# Patient Record
Sex: MALE | Race: WHITE
[De-identification: names, ages, dates, MRNs, and addresses within clinical notes are randomized per-mention and may not be internally consistent; named-entity substitution may affect disease eponyms.]

---

## 2021-11-15 PROBLEM — Z00.00 ENCOUNTER FOR PREVENTIVE HEALTH EXAMINATION: Status: ACTIVE | Noted: 2021-11-15

## 2021-11-17 ENCOUNTER — APPOINTMENT (OUTPATIENT)
Dept: PULMONOLOGY | Facility: CLINIC | Age: 71
End: 2021-11-17
Payer: COMMERCIAL

## 2021-11-17 VITALS
HEART RATE: 89 BPM | DIASTOLIC BLOOD PRESSURE: 90 MMHG | SYSTOLIC BLOOD PRESSURE: 144 MMHG | HEIGHT: 72 IN | OXYGEN SATURATION: 97 % | BODY MASS INDEX: 24.79 KG/M2 | WEIGHT: 183 LBS | RESPIRATION RATE: 16 BRPM

## 2021-11-17 DIAGNOSIS — Z86.16 PERSONAL HISTORY OF COVID-19: ICD-10-CM

## 2021-11-17 DIAGNOSIS — R93.89 ABNORMAL FINDINGS ON DIAGNOSTIC IMAGING OF OTHER SPECIFIED BODY STRUCTURES: ICD-10-CM

## 2021-11-17 DIAGNOSIS — Z86.79 PERSONAL HISTORY OF OTHER DISEASES OF THE CIRCULATORY SYSTEM: ICD-10-CM

## 2021-11-17 DIAGNOSIS — Z86.39 PERSONAL HISTORY OF OTHER ENDOCRINE, NUTRITIONAL AND METABOLIC DISEASE: ICD-10-CM

## 2021-11-17 DIAGNOSIS — Z87.898 PERSONAL HISTORY OF OTHER SPECIFIED CONDITIONS: ICD-10-CM

## 2021-11-17 PROCEDURE — G0296 VISIT TO DETERM LDCT ELIG: CPT

## 2021-11-17 PROCEDURE — 99204 OFFICE O/P NEW MOD 45 MIN: CPT | Mod: 25

## 2021-11-17 RX ORDER — GLIPIZIDE 10 MG/1
10 TABLET ORAL
Refills: 0 | Status: ACTIVE | COMMUNITY

## 2021-11-17 RX ORDER — ATORVASTATIN CALCIUM 20 MG/1
20 TABLET, FILM COATED ORAL
Refills: 0 | Status: ACTIVE | COMMUNITY

## 2021-11-17 RX ORDER — LEVOTHYROXINE SODIUM 0.05 MG/1
50 TABLET ORAL
Refills: 0 | Status: ACTIVE | COMMUNITY

## 2021-11-17 RX ORDER — CLOPIDOGREL BISULFATE 75 MG/1
75 TABLET, FILM COATED ORAL
Refills: 0 | Status: ACTIVE | COMMUNITY

## 2021-11-17 RX ORDER — INSULIN GLARGINE 100 [IU]/ML
INJECTION, SOLUTION SUBCUTANEOUS
Refills: 0 | Status: ACTIVE | COMMUNITY

## 2021-11-17 RX ORDER — METFORMIN HYDROCHLORIDE 1000 MG/1
1000 TABLET, COATED ORAL
Refills: 0 | Status: ACTIVE | COMMUNITY

## 2021-11-29 ENCOUNTER — NON-APPOINTMENT (OUTPATIENT)
Age: 71
End: 2021-11-29

## 2021-11-29 ENCOUNTER — APPOINTMENT (OUTPATIENT)
Dept: CT IMAGING | Facility: CLINIC | Age: 71
End: 2021-11-29
Payer: COMMERCIAL

## 2021-11-29 ENCOUNTER — OUTPATIENT (OUTPATIENT)
Dept: OUTPATIENT SERVICES | Facility: HOSPITAL | Age: 71
LOS: 1 days | End: 2021-11-29
Payer: COMMERCIAL

## 2021-11-29 VITALS — WEIGHT: 183 LBS | BODY MASS INDEX: 24.79 KG/M2 | HEIGHT: 72 IN

## 2021-11-29 DIAGNOSIS — Z87.891 PERSONAL HISTORY OF NICOTINE DEPENDENCE: ICD-10-CM

## 2021-11-29 PROCEDURE — 71271 CT THORAX LUNG CANCER SCR C-: CPT

## 2021-11-29 PROCEDURE — 71271 CT THORAX LUNG CANCER SCR C-: CPT | Mod: 26

## 2021-11-29 NOTE — HISTORY OF PRESENT ILLNESS
[TextBox_13] : Referred by Dr. Juan Clinton.\par \par Mr. IBARRA is a 71 year old male with a history of high cholesterol, HTN and CAD.\par \par He was called to review eligibility for Low-Dose CT lung cancer screening.  Reviewed and confirmed that the patient meets screening eligibility criteria:\par \par 71 years old \par \par Smoking Status: Former smoker\par \par Number of pack(s) per day: 1\par Number of years smoked: 45\par Number of pack years smokin\par \par Number of years since quitting smokin\par Quit year: 2017\par \par Mr. IBARRA denies any symptoms of lung cancer, including new cough, change in cough, hemoptysis, and unintentional weight loss.\par \par Mr. IBARRA denies any personal history of lung cancer.  No lung cancer in a first degree relative.  Denies any history of lung disease or any history of occupational exposures.

## 2021-12-07 ENCOUNTER — APPOINTMENT (OUTPATIENT)
Dept: PULMONOLOGY | Facility: CLINIC | Age: 71
End: 2021-12-07
Payer: COMMERCIAL

## 2021-12-07 VITALS — WEIGHT: 188 LBS | BODY MASS INDEX: 25.75 KG/M2 | HEIGHT: 71.5 IN

## 2021-12-07 PROCEDURE — 94727 GAS DIL/WSHOT DETER LNG VOL: CPT

## 2021-12-07 PROCEDURE — 94010 BREATHING CAPACITY TEST: CPT

## 2021-12-07 PROCEDURE — 85018 HEMOGLOBIN: CPT | Mod: QW

## 2021-12-07 PROCEDURE — 94729 DIFFUSING CAPACITY: CPT

## 2021-12-08 ENCOUNTER — APPOINTMENT (OUTPATIENT)
Dept: PULMONOLOGY | Facility: CLINIC | Age: 71
End: 2021-12-08
Payer: COMMERCIAL

## 2021-12-08 VITALS
DIASTOLIC BLOOD PRESSURE: 100 MMHG | WEIGHT: 185 LBS | BODY MASS INDEX: 25.44 KG/M2 | SYSTOLIC BLOOD PRESSURE: 180 MMHG | HEART RATE: 86 BPM | OXYGEN SATURATION: 97 % | RESPIRATION RATE: 14 BRPM

## 2021-12-08 PROCEDURE — 99214 OFFICE O/P EST MOD 30 MIN: CPT

## 2021-12-08 RX ORDER — TIOTROPIUM BROMIDE INHALATION SPRAY 3.12 UG/1
2.5 SPRAY, METERED RESPIRATORY (INHALATION) DAILY
Qty: 3 | Refills: 3 | Status: ACTIVE | COMMUNITY
Start: 2021-12-08 | End: 1900-01-01

## 2022-06-06 ENCOUNTER — APPOINTMENT (OUTPATIENT)
Dept: PULMONOLOGY | Facility: CLINIC | Age: 72
End: 2022-06-06

## 2022-08-01 ENCOUNTER — APPOINTMENT (OUTPATIENT)
Dept: PULMONOLOGY | Facility: CLINIC | Age: 72
End: 2022-08-01

## 2022-08-01 VITALS
RESPIRATION RATE: 14 BRPM | BODY MASS INDEX: 25.33 KG/M2 | DIASTOLIC BLOOD PRESSURE: 70 MMHG | HEART RATE: 78 BPM | WEIGHT: 185 LBS | OXYGEN SATURATION: 97 % | HEIGHT: 71.5 IN | SYSTOLIC BLOOD PRESSURE: 142 MMHG

## 2022-08-01 DIAGNOSIS — R01.1 CARDIAC MURMUR, UNSPECIFIED: ICD-10-CM

## 2022-08-01 PROCEDURE — G0296 VISIT TO DETERM LDCT ELIG: CPT

## 2022-08-01 PROCEDURE — 99214 OFFICE O/P EST MOD 30 MIN: CPT | Mod: 25

## 2022-08-01 RX ORDER — LISINOPRIL 20 MG/1
20 TABLET ORAL
Refills: 0 | Status: DISCONTINUED | COMMUNITY
End: 2022-08-01

## 2022-11-28 ENCOUNTER — NON-APPOINTMENT (OUTPATIENT)
Age: 72
End: 2022-11-28

## 2022-11-28 VITALS — HEIGHT: 71.5 IN | BODY MASS INDEX: 25.33 KG/M2 | WEIGHT: 185 LBS

## 2022-11-28 NOTE — HISTORY OF PRESENT ILLNESS
[Former] : Former [TextBox_13] : Referred by Dr. Juan Clinton.\par \par Mr. IBARRA is a 72 year old male with a history of high cholesterol, HTN and CAD.  Reviewed and confirmed that the patient meets screening eligibility criteria:\par \par 72 years old \par \par Smoking Status: Former smoker\par \par Number of pack(s) per day: 1\par Number of years smoked: 45\par Number of pack years smokin\par \par Number of years since quitting smokin\par Quit year: 2017\par \par No symptoms of lung cancer, including new cough, change in cough, hemoptysis, and unintentional weight loss.\par \par No personal history of lung cancer. No lung cancer in a first degree relative. No history of lung disease or occupational exposures.  [YearQuit] : 2017 [PacksperDay] : 1 [N_Years] : 45 [PacksperYear] : 45

## 2022-11-30 ENCOUNTER — OUTPATIENT (OUTPATIENT)
Dept: OUTPATIENT SERVICES | Facility: HOSPITAL | Age: 72
LOS: 1 days | End: 2022-11-30
Payer: COMMERCIAL

## 2022-11-30 ENCOUNTER — APPOINTMENT (OUTPATIENT)
Dept: CT IMAGING | Facility: CLINIC | Age: 72
End: 2022-11-30

## 2022-11-30 DIAGNOSIS — R93.89 ABNORMAL FINDINGS ON DIAGNOSTIC IMAGING OF OTHER SPECIFIED BODY STRUCTURES: ICD-10-CM

## 2022-11-30 DIAGNOSIS — Z87.891 PERSONAL HISTORY OF NICOTINE DEPENDENCE: ICD-10-CM

## 2022-11-30 DIAGNOSIS — R05.9 COUGH, UNSPECIFIED: ICD-10-CM

## 2022-11-30 PROCEDURE — 71271 CT THORAX LUNG CANCER SCR C-: CPT | Mod: 26

## 2022-11-30 PROCEDURE — 71271 CT THORAX LUNG CANCER SCR C-: CPT

## 2022-12-05 ENCOUNTER — APPOINTMENT (OUTPATIENT)
Dept: PULMONOLOGY | Facility: CLINIC | Age: 72
End: 2022-12-05

## 2022-12-05 VITALS
HEART RATE: 94 BPM | OXYGEN SATURATION: 97 % | SYSTOLIC BLOOD PRESSURE: 146 MMHG | WEIGHT: 185 LBS | RESPIRATION RATE: 16 BRPM | HEIGHT: 71 IN | DIASTOLIC BLOOD PRESSURE: 82 MMHG | BODY MASS INDEX: 25.9 KG/M2

## 2022-12-05 PROCEDURE — 99214 OFFICE O/P EST MOD 30 MIN: CPT

## 2022-12-05 RX ORDER — GLIPIZIDE 10 MG/1
10 TABLET, FILM COATED, EXTENDED RELEASE ORAL
Qty: 90 | Refills: 0 | Status: DISCONTINUED | COMMUNITY
Start: 2022-01-08

## 2022-12-05 RX ORDER — HYDROCHLOROTHIAZIDE 12.5 MG/1
12.5 TABLET ORAL
Qty: 90 | Refills: 0 | Status: ACTIVE | COMMUNITY
Start: 2022-10-08

## 2022-12-05 RX ORDER — LISINOPRIL 40 MG/1
40 TABLET ORAL
Qty: 90 | Refills: 0 | Status: ACTIVE | COMMUNITY
Start: 2022-04-13

## 2022-12-05 RX ORDER — AMLODIPINE BESYLATE 10 MG/1
10 TABLET ORAL
Qty: 90 | Refills: 0 | Status: ACTIVE | COMMUNITY
Start: 2022-11-20

## 2022-12-05 NOTE — HISTORY OF PRESENT ILLNESS
[Former] : former [TextBox_4] : Intermittent cough, In the past, episodes of bronchitis. No wheeze. No sob. \par \par Developed covid in Sept. Dx 9/1021 at . Treated at home. No medication. Did well. \par \par PFT done 12/7/21with mod/severe obstruction.\par \par Gave spiriva  in Dec 2021. Had episodes of soar on tongue at the time; says has not looked at his tongue so not sure of status. Had vision issues which resolved. Advised last visit to see optho; did not. \par \par Taking it on and off. \par \par Cant tell if the spiriva made any difference with bronchitis; has not had any bronchitis.  \par \par LDCT done in November 2022; results pening.   \par \par Saw ENT for epistaxis in 2016. Resolved with smoking cessation. \par \par Gives hx of murmur. Said still never saw cardio.\par \par Former smoker quit 2017. Smoked for about 45 years X 1 ppd. [YearQuit] : 2017

## 2022-12-05 NOTE — PHYSICAL EXAM
[No Acute Distress] : no acute distress [Normal Oropharynx] : normal oropharynx [Supple] : supple [Normal Rate/Rhythm] : normal rate/rhythm [Normal S1, S2] : normal s1, s2 [No Resp Distress] : no resp distress [No Acc Muscle Use] : no acc muscle use [Normal Rhythm and Effort] : normal rhythm and effort [Clear to Auscultation Bilaterally] : clear to auscultation bilaterally [Not Tender] : not tender [No Clubbing] : no clubbing [Normal Color/ Pigmentation] : normal color/ pigmentation [Oriented x3] : oriented x3 [Normal Mood] : normal mood [Normal Affect] : normal affect [TextBox_11] : no apthous ulcers, no stomatits seen by me [TextBox_54] : systolic murmur LUSB

## 2023-06-05 ENCOUNTER — APPOINTMENT (OUTPATIENT)
Dept: PULMONOLOGY | Facility: CLINIC | Age: 73
End: 2023-06-05
Payer: COMMERCIAL

## 2023-06-05 VITALS
RESPIRATION RATE: 16 BRPM | BODY MASS INDEX: 25.9 KG/M2 | DIASTOLIC BLOOD PRESSURE: 70 MMHG | HEART RATE: 90 BPM | HEIGHT: 71 IN | SYSTOLIC BLOOD PRESSURE: 162 MMHG | OXYGEN SATURATION: 96 % | WEIGHT: 185 LBS

## 2023-06-05 DIAGNOSIS — R05.9 COUGH, UNSPECIFIED: ICD-10-CM

## 2023-06-05 PROCEDURE — 99214 OFFICE O/P EST MOD 30 MIN: CPT | Mod: 25

## 2023-06-05 PROCEDURE — G0296 VISIT TO DETERM LDCT ELIG: CPT

## 2023-06-05 NOTE — PROCEDURE
[FreeTextEntry1] : \par EXAM: [id number] - CT LDCT LUNG CA SCREENING - ORDERED BY: HARVINDER VO\par \par \par PROCEDURE DATE: 11/30/2022\par \par \par \par INTERPRETATION: INDICATION: 45 pack year history of smoking. Individual is Former smoker. Lung cancer screening.\par \par TECHNIQUE: Low dose CT scan of the chest was obtained without intravenous contrast.\par \par COMPARISON: CT chest 11/29/2021\par \par FINDINGS:\par \par Lymph nodes: The thyroid gland is normal. The chest lymph nodes measure less than 10 mm the short axis. The esophagus is unremarkable.\par \par Heart/vasculature: The heart is normal in size. Coronary artery calcifications. No pericardial effusion. Aortic valve calcifications. Left-sided aortic arch and left-sided descending thoracic aorta. Aorta is normal in caliber.\par \par Airways/lungs/pleura: Emphysema. Since 11/29/2022, the 5 mm opacity along the right minor fissure is unchanged. The remainder of the lungs are clear.\par \par The airways and pleura are normal.\par \par Upper abdomen: The upper abdomen is unremarkable.\par \par Bones/soft tissues: The bones are unremarkable.\par \par IMPRESSION:\par \par \par 1. No change in the 5 mm lingular opacity along the right minor fissure.\par 2. Lung RADS 2.\par 3. Recommendation: CT chest without contrast in 12 months.\par \par --- End of Report ---\par \par \par \par \par \par \par TAYLOR KHAN MD; Attending Radiologist\par This document has been electronically signed. Dec 5 2022 4:18PM

## 2023-06-05 NOTE — HISTORY OF PRESENT ILLNESS
[Former] : former [>= 20 pack years] : >= 20 pack years [TextBox_4] : Hx COPD.\par \par Some SOB; worsened since Dec. Intermittent cough. With postnasal drip. No wheeze. No sob. \par \par Was using albuterol every morning. Feels like it helped getting up mucous. Less sob when using it.  \par \par PFT done 12/7/21with mod/severe obstruction.\par \par Gave spiriva  in Dec 2021. Had episodes of soar on tongue at the time; says still having issues with soars on tongue even has not looked at his tongue so not sure of status. Had vision issues which resolved. Advised last visit to see optho which he did and told all ok. Does not want to go back to the spiriva. \par \par LDCT done in November 2022.    \par \par Saw ENT for epistaxis in 2016. Resolved with smoking cessation. \par \par Gives hx of murmur. Said still never saw cardio.\par \par Former smoker quit 2017. Smoked for about 45 years X 1 ppd.

## 2023-11-06 ENCOUNTER — NON-APPOINTMENT (OUTPATIENT)
Age: 73
End: 2023-11-06

## 2023-11-06 VITALS — WEIGHT: 185 LBS | BODY MASS INDEX: 25.9 KG/M2 | HEIGHT: 71 IN

## 2023-12-04 ENCOUNTER — OUTPATIENT (OUTPATIENT)
Dept: OUTPATIENT SERVICES | Facility: HOSPITAL | Age: 73
LOS: 1 days | End: 2023-12-04
Payer: COMMERCIAL

## 2023-12-04 ENCOUNTER — APPOINTMENT (OUTPATIENT)
Dept: CT IMAGING | Facility: CLINIC | Age: 73
End: 2023-12-04
Payer: COMMERCIAL

## 2023-12-04 DIAGNOSIS — Z87.891 PERSONAL HISTORY OF NICOTINE DEPENDENCE: ICD-10-CM

## 2023-12-04 PROCEDURE — 71271 CT THORAX LUNG CANCER SCR C-: CPT | Mod: 26

## 2023-12-04 PROCEDURE — 71271 CT THORAX LUNG CANCER SCR C-: CPT

## 2023-12-19 ENCOUNTER — APPOINTMENT (OUTPATIENT)
Dept: PULMONOLOGY | Facility: CLINIC | Age: 73
End: 2023-12-19
Payer: COMMERCIAL

## 2023-12-19 VITALS — HEIGHT: 70 IN | BODY MASS INDEX: 27.63 KG/M2 | WEIGHT: 193 LBS

## 2023-12-19 VITALS
DIASTOLIC BLOOD PRESSURE: 72 MMHG | HEART RATE: 86 BPM | OXYGEN SATURATION: 96 % | SYSTOLIC BLOOD PRESSURE: 152 MMHG | RESPIRATION RATE: 16 BRPM

## 2023-12-19 PROCEDURE — 94010 BREATHING CAPACITY TEST: CPT

## 2023-12-19 PROCEDURE — 94729 DIFFUSING CAPACITY: CPT

## 2023-12-19 PROCEDURE — 99214 OFFICE O/P EST MOD 30 MIN: CPT | Mod: 25

## 2023-12-19 PROCEDURE — 94727 GAS DIL/WSHOT DETER LNG VOL: CPT

## 2023-12-19 PROCEDURE — 85018 HEMOGLOBIN: CPT | Mod: QW

## 2023-12-19 NOTE — PROCEDURE
[FreeTextEntry1] : PFT done today 12/19/23: obstruction in the moderate range; lung volumes with air trapping; DLCO normal -------------  EXAM: [id number] - CT LDCT LUNG CA SCREENING - ORDERED BY: HARVINDER VO   PROCEDURE DATE: 12/04/2023    INTERPRETATION: EXAMINATION: CT CHEST LUNG CANCER SCREENING  CLINICAL INDICATION: Lung Cancer Screening, Smoking History. The patient is a former smoker. . Smoking history of 45 pack-years.  TECHNIQUE: A noncontrast low dose CT of the thorax was performed.  COMPARISON: 11/30/2022, 11/29/2021  INTERPRETATION:  AIRWAYS AND LUNGS: The central tracheobronchial tree is patent. Emphysema. Unchanged 5 mm opacity along right minor fissure.  MEDIASTINUM AND PLEURA: There are no enlarged mediastinal, hilar or axillary lymph nodes. The visualized portion of the thyroid gland is unremarkable. There is no pleural effusion. There is no pneumothorax.  HEART AND VESSELS: The heart is normal in size. There are atherosclerotic calcifications of the aorta and coronary arteries.. There is no pericardial effusion. Aortic valve calcification.  UPPER ABDOMEN: Images of the upper abdomen demonstrate no abnormality.  BONES AND SOFT TISSUES: The bones are unremarkable. The soft tissues are unremarkable.   IMPRESSION: Stable exam.  Lung RADS 2 - Benign Appearance or Behavior. Nodules with a very low likelihood of becoming a clinically active cancer due to size or lack of growth.  RECOMMENDATION: Continue annual screening with low dose chest CT in 12 months.  --- End of Report ---       AUSTIN MASTERS MD; Attending Radiologist This document has been electronically signed. Dec 13 2023 9:30AM

## 2023-12-19 NOTE — PLAN
[TextEntry] : Albuterol prn. Again, recc dental eval regarding tongue.  Flonase and nasal saline. ENT re-eval if not help. Exercise. Reviewed with patient current treatment recommendations for COPD. Medications as ordered. Annual flu vaccine. Follow vaccine recommendations for pneumonia vaccine. Pulmonary rehab; he defers b/c he cannot get there. Reviewed what symptoms suggest acute exacerbation. LDCT in 2024. [facility] rec cardio evel re: murmur and coronary calcifications; he has the info. Further reccs will come.

## 2023-12-19 NOTE — HISTORY OF PRESENT ILLNESS
[Former] : former [>= 20 pack years] : >= 20 pack years [TextBox_4] : Hx COPD.  At baseline. No increased MARINELLI.  No cough.  No wheeze.   PFT done today 12/19/23 with slight increase in FEV1.    Gave spiriva  in Dec 2021. Had episodes of soar on tongue at the time; says still having issues with soars on tongue even has not looked at his tongue so not sure of status. Did not go back to ENT or see dental as recc. Does not want to go back to the spiriva.   Latest LDCT done reviewed below.     Saw ENT for epistaxis in 2016. Resolved with smoking cessation.   Gives hx of murmur. Said still never saw cardio. Never went to see cardio I recc him to see.   Former smoker quit 2017. Smoked for about 45 years X 1 ppd.

## 2024-01-15 RX ORDER — ALBUTEROL SULFATE 90 UG/1
108 (90 BASE) POWDER, METERED RESPIRATORY (INHALATION) EVERY 4 HOURS
Qty: 3 | Refills: 3 | Status: ACTIVE | COMMUNITY
Start: 2022-12-05 | End: 1900-01-01

## 2024-06-18 ENCOUNTER — APPOINTMENT (OUTPATIENT)
Dept: PULMONOLOGY | Facility: CLINIC | Age: 74
End: 2024-06-18
Payer: COMMERCIAL

## 2024-06-18 VITALS
HEIGHT: 70 IN | BODY MASS INDEX: 26.77 KG/M2 | RESPIRATION RATE: 16 BRPM | DIASTOLIC BLOOD PRESSURE: 60 MMHG | HEART RATE: 79 BPM | SYSTOLIC BLOOD PRESSURE: 142 MMHG | OXYGEN SATURATION: 97 % | WEIGHT: 187 LBS

## 2024-06-18 DIAGNOSIS — Z87.891 PERSONAL HISTORY OF NICOTINE DEPENDENCE: ICD-10-CM

## 2024-06-18 DIAGNOSIS — R06.02 SHORTNESS OF BREATH: ICD-10-CM

## 2024-06-18 DIAGNOSIS — R91.1 SOLITARY PULMONARY NODULE: ICD-10-CM

## 2024-06-18 DIAGNOSIS — J43.9 EMPHYSEMA, UNSPECIFIED: ICD-10-CM

## 2024-06-18 DIAGNOSIS — J44.9 CHRONIC OBSTRUCTIVE PULMONARY DISEASE, UNSPECIFIED: ICD-10-CM

## 2024-06-18 PROCEDURE — 99214 OFFICE O/P EST MOD 30 MIN: CPT

## 2024-06-18 PROCEDURE — G0296 VISIT TO DETERM LDCT ELIG: CPT

## 2024-06-18 NOTE — PROCEDURE
[FreeTextEntry1] : PFT done 12/19/23: obstruction in the moderate range; lung volumes with air trapping; DLCO normal -------------  EXAM: [id number] - CT LDCT LUNG CA SCREENING - ORDERED BY: HARVINDER VO   PROCEDURE DATE: 12/04/2023    INTERPRETATION: EXAMINATION: CT CHEST LUNG CANCER SCREENING  CLINICAL INDICATION: Lung Cancer Screening, Smoking History. The patient is a former smoker. . Smoking history of 45 pack-years.  TECHNIQUE: A noncontrast low dose CT of the thorax was performed.  COMPARISON: 11/30/2022, 11/29/2021  INTERPRETATION:  AIRWAYS AND LUNGS: The central tracheobronchial tree is patent. Emphysema. Unchanged 5 mm opacity along right minor fissure.  MEDIASTINUM AND PLEURA: There are no enlarged mediastinal, hilar or axillary lymph nodes. The visualized portion of the thyroid gland is unremarkable. There is no pleural effusion. There is no pneumothorax.  HEART AND VESSELS: The heart is normal in size. There are atherosclerotic calcifications of the aorta and coronary arteries.. There is no pericardial effusion. Aortic valve calcification.  UPPER ABDOMEN: Images of the upper abdomen demonstrate no abnormality.  BONES AND SOFT TISSUES: The bones are unremarkable. The soft tissues are unremarkable.   IMPRESSION: Stable exam.  Lung RADS 2 - Benign Appearance or Behavior. Nodules with a very low likelihood of becoming a clinically active cancer due to size or lack of growth.  RECOMMENDATION: Continue annual screening with low dose chest CT in 12 months.  --- End of Report ---       AUSTIN MASTERS MD; Attending Radiologist This document has been electronically signed. Dec 13 2023 9:30AM

## 2024-06-18 NOTE — HISTORY OF PRESENT ILLNESS
[Former] : former [>= 20 pack years] : >= 20 pack years [TextBox_4] : Hx COPD.  Feeling well.  No increased MARINELLI.  No cough.  No wheeze.   Using albuterol in am mainly as routine.   PFT done 12/19/23 with slight increase in FEV1.    Gave spiriva  in Dec 2021. Had episodes of soar on tongue at the time; says still having issues with soars on tongue even has not looked at his tongue so not sure of status. Still did not go back to ENT or see dental as recc. Does not want to go back to the spiriva.   Latest LDCT done reviewed below.     Gives hx of murmur. Said still never saw cardio. Still never went to see cardio I recc him to see.   Former smoker quit 2017. Smoked for about 45 years X 1 ppd.

## 2024-06-18 NOTE — PLAN
[TextEntry] : Albuterol prn. Again, recc dental eval regarding tongue.  Flonase and nasal saline. ENT re-eval if not help. Exercise. Reviewed with patient current treatment recommendations for COPD. Medications as ordered. Annual flu vaccine. Follow vaccine recommendations for pneumonia vaccine. Should do pulmonary rehab. Reviewed what symptoms suggest acute exacerbation. LDCT in 2024. LDCT for lung cancer screening. Reviewed with the patient lung cancer screening with low dose CT. Reviewed risks and benefits of doing screening. Again recc cardio eval re: murmur and coronary calcifications; he has the info. Further reccs will come.

## 2024-12-30 ENCOUNTER — APPOINTMENT (OUTPATIENT)
Dept: PULMONOLOGY | Facility: CLINIC | Age: 74
End: 2024-12-30

## 2024-12-30 VITALS
RESPIRATION RATE: 16 BRPM | HEART RATE: 92 BPM | DIASTOLIC BLOOD PRESSURE: 66 MMHG | OXYGEN SATURATION: 96 % | SYSTOLIC BLOOD PRESSURE: 122 MMHG

## 2024-12-30 VITALS — HEIGHT: 71 IN | WEIGHT: 185 LBS | BODY MASS INDEX: 25.9 KG/M2

## 2024-12-30 DIAGNOSIS — J43.9 EMPHYSEMA, UNSPECIFIED: ICD-10-CM

## 2024-12-30 DIAGNOSIS — J44.9 CHRONIC OBSTRUCTIVE PULMONARY DISEASE, UNSPECIFIED: ICD-10-CM

## 2024-12-30 DIAGNOSIS — R91.1 SOLITARY PULMONARY NODULE: ICD-10-CM

## 2024-12-30 DIAGNOSIS — Z87.891 PERSONAL HISTORY OF NICOTINE DEPENDENCE: ICD-10-CM

## 2024-12-30 DIAGNOSIS — R06.02 SHORTNESS OF BREATH: ICD-10-CM

## 2024-12-30 PROCEDURE — G0296 VISIT TO DETERM LDCT ELIG: CPT

## 2024-12-30 PROCEDURE — 99214 OFFICE O/P EST MOD 30 MIN: CPT | Mod: 25

## 2024-12-30 PROCEDURE — 94010 BREATHING CAPACITY TEST: CPT
